# Patient Record
Sex: MALE | Race: WHITE | NOT HISPANIC OR LATINO | Employment: STUDENT | URBAN - METROPOLITAN AREA
[De-identification: names, ages, dates, MRNs, and addresses within clinical notes are randomized per-mention and may not be internally consistent; named-entity substitution may affect disease eponyms.]

---
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
## 2024-08-06 NOTE — ADDENDUM NOTE
Addended by: Beba Manley on: 7/4/2020 02:44 PM     Modules accepted: Orders Detail Level: Zone Detail Level: Detailed